# Patient Record
Sex: MALE | Race: WHITE | NOT HISPANIC OR LATINO | URBAN - NONMETROPOLITAN AREA
[De-identification: names, ages, dates, MRNs, and addresses within clinical notes are randomized per-mention and may not be internally consistent; named-entity substitution may affect disease eponyms.]

---

## 2017-02-27 PROBLEM — H40.013: Noted: 2021-01-26

## 2017-02-27 PROBLEM — H52.4: Noted: 2017-02-27

## 2017-02-27 PROBLEM — H40.013: Noted: 2017-02-27

## 2017-02-27 PROBLEM — H25.13: Noted: 2017-02-27

## 2017-02-27 PROBLEM — H25.813: Noted: 2021-01-26

## 2019-06-21 NOTE — PATIENT DISCUSSION
Patient understands condition, prognosis and need for follow up care. states does not take vaccine refused when offered

## 2019-12-11 ENCOUNTER — IMPORTED ENCOUNTER (OUTPATIENT)
Dept: URBAN - NONMETROPOLITAN AREA CLINIC 1 | Facility: CLINIC | Age: 74
End: 2019-12-11

## 2019-12-11 PROCEDURE — 92014 COMPRE OPH EXAM EST PT 1/>: CPT

## 2019-12-11 PROCEDURE — 92015 DETERMINE REFRACTIVE STATE: CPT

## 2019-12-11 NOTE — PATIENT DISCUSSION
Presbyopia OU- Discussed diagnosis in detail with patient- New glasses Rx given today- Continue to monitorBorderline Glaucoma OU :secondary to steroid medications- Discussed diagnosis in detail with patient- Patient is a steroid responder - IOP today 22 OU- Cup to Disc noted at .35 OU- OCT done in the past stable by GPA- Continue to monitor Zavalla OU- Discussed diagnosis in detail  with patient - Discussed signs and symptoms of progression- Discussed UV protection- No treatment needed at this time - BAT donetodya 20/40 OU - Continue to monitor; 's Notes: MR 12/11/19DFE 12/11/19

## 2020-06-19 NOTE — PATIENT DISCUSSION
DISC PHOTOS STABLE OU.  DISCUSSED POSSIBLE CHANGES ON OCT FROM PREVIOUS VISIT.  REVIEWED 2 RECENT HVF'S WHICH APPEAR STABLE.  WILL HAVE PT CONTINUE CURRENT MEDICATIONS AT THIS TIME AND MONITOR AGAIN IN 4 MONTHS.  MAY NEED TO TARGET IOP LOW TEENS.

## 2020-10-16 NOTE — PATIENT DISCUSSION
VF worsening OU target lower IOP . Pt has had some compliance issues stressed importance of. Will have pt stop Azopt and start Dorzolamide/ Timolol . Will continue Lumigan (Latanoprost).

## 2021-01-26 ENCOUNTER — PREPPED CHART (OUTPATIENT)
Dept: URBAN - NONMETROPOLITAN AREA CLINIC 1 | Facility: CLINIC | Age: 76
End: 2021-01-26

## 2021-01-26 ENCOUNTER — IMPORTED ENCOUNTER (OUTPATIENT)
Dept: URBAN - NONMETROPOLITAN AREA CLINIC 1 | Facility: CLINIC | Age: 76
End: 2021-01-26

## 2021-01-26 PROCEDURE — 92014 COMPRE OPH EXAM EST PT 1/>: CPT

## 2021-01-26 PROCEDURE — 92015 DETERMINE REFRACTIVE STATE: CPT

## 2021-01-26 NOTE — PATIENT DISCUSSION
Discussed diagnosis in detail with patient. Recommend UV protection. BAT done previously 20/40 OU. No treatment needed at this time and will continue to monitor.

## 2021-01-26 NOTE — PATIENT DISCUSSION
Presbyopia OU- Discussed diagnosis in detail with patient- New glasses Rx given today- Continue to monitorBorderline Glaucoma OU :secondary to steroid medications- Discussed diagnosis in detail with patient- Patient is a steroid responder - IOP today 18 OU- Cup to Disc noted at .35 OU- OCT done in the past stable by GPA- Continue to monitor Cataracts OU- Discussed diagnosis in detail  with patient - Discussed signs and symptoms of progression- Discussed UV protection- No treatment needed at this time - BAT done previously 20/40 OU - Continue to monitor; 's Notes: MR 12/11/19DFE 12/11/19

## 2021-01-26 NOTE — PATIENT DISCUSSION
Discussed diagnosis in detail with patient. Patient is a steroid responder. IOP today 18 OU and cup to Disc noted at .35 OU. OCT done previously and stable by GPA.

## 2021-04-16 NOTE — PATIENT DISCUSSION
PLAN: ***PT elects BASIC PLUS and NAHUN Gu*** CE/IOL W/HYDRUS VS. I-STENT OD ONLY AT THIS TIME, goal -2.00. P.O w/O.D in EW. No van needed. Not a candidate for AV secondary to POAG. Pt wants to go home and think about lens options. Pt will let us know about drops as well. Will bill for efrain when he calls.

## 2021-04-16 NOTE — PATIENT DISCUSSION
PLAN: ***Pt elects BASIC PLUS and IMPRIMIS. -Riccardo** CE/IOL W/HYDRUS VS. I-STENT OD ONLY AT THIS TIME, goal -2.00. P.O w/O.D in EW. No van needed. Not a candidate for AV secondary to POAG. Pt wants to go home and think about lens options. Pt will let us know about drops as well. Will bill for efrain when he calls.

## 2022-01-21 ASSESSMENT — TONOMETRY
OS_IOP_MMHG: 18
OD_IOP_MMHG: 18

## 2022-01-21 ASSESSMENT — VISUAL ACUITY
OS_SC: 20/25-
OD_SC: 20/25-

## 2022-01-27 ENCOUNTER — ESTABLISHED PATIENT (OUTPATIENT)
Dept: URBAN - NONMETROPOLITAN AREA CLINIC 1 | Facility: CLINIC | Age: 77
End: 2022-01-27

## 2022-01-27 DIAGNOSIS — H52.4: ICD-10-CM

## 2022-01-27 PROCEDURE — 92015 DETERMINE REFRACTIVE STATE: CPT

## 2022-01-27 PROCEDURE — 92014 COMPRE OPH EXAM EST PT 1/>: CPT

## 2022-01-27 ASSESSMENT — VISUAL ACUITY
OS_CC: 20/20-1
OD_CC: J1
OS_CC: J1+
OS_SC: 20/40
OD_SC: 20/40
OD_CC: 20/20-1

## 2022-01-27 ASSESSMENT — TONOMETRY
OS_IOP_MMHG: 15
OD_IOP_MMHG: 14

## 2022-04-09 ASSESSMENT — PACHYMETRY
OS_CT_UM: 656; ADJ: THICK
OS_CT_UM: 656; ADJ: THICK
OD_CT_UM: 615; ADJ: THICK
OD_CT_UM: 615; ADJ: THICK

## 2022-04-09 ASSESSMENT — VISUAL ACUITY
OD_GLARE: 20/40
OD_CC: 20/40+
OD_CC: 20/25-
OD_PH: 20/20-2
OS_GLARE: 20/40
OD_SC: 20/100
OS_CC: 20/25-
OS_CC: 20/30-2
OS_SC: 20/100

## 2022-04-09 ASSESSMENT — TONOMETRY
OS_IOP_MMHG: 18
OD_IOP_MMHG: 22
OD_IOP_MMHG: 18
OS_IOP_MMHG: 22

## 2022-12-19 NOTE — PATIENT DISCUSSION
Discussed condition and exacerbating conditions/situations (e.g., dry/arid environments, overhead fans, air conditioners, side effect of medications).
Discussed lid hygiene, warm compress and eyelid wash.
No treatment currently indicated at this time.
OCT shows possible worsening OD.  OS stable.  Recommend lower IOP OD.  Discussed adding gtt vs SLT.  Pt currently without insurance.  Wishes to defer SLT at this time.  Will increase Timolol to BID OD. Continue qam OS and Lumigan and Azopt as directed.
Patient achieved a 15% reduction in IOP from pretreatment levels or a plan is in place to achieve this goal.
Patient understands condition, prognosis and need for follow up care.
Recommended artificial tears to use as directed.
Recommended artificial tears to use: 1 drop 4x a day in both eyes.
Stable.
The cataracts are mild and have minimal impact on vision at this time.
No

## 2023-06-20 ENCOUNTER — ESTABLISHED PATIENT (OUTPATIENT)
Dept: URBAN - NONMETROPOLITAN AREA CLINIC 1 | Facility: CLINIC | Age: 78
End: 2023-06-20

## 2023-06-20 DIAGNOSIS — H52.4: ICD-10-CM

## 2023-06-20 PROCEDURE — 92014 COMPRE OPH EXAM EST PT 1/>: CPT

## 2023-06-20 PROCEDURE — 92015 DETERMINE REFRACTIVE STATE: CPT

## 2023-06-20 ASSESSMENT — VISUAL ACUITY
OD_PH: 20/30-1
OS_PH: 20/30-2
OD_SC: 20/50+2
OS_SC: 20/40-1
OU_SC: 20/30-1

## 2023-06-20 ASSESSMENT — TONOMETRY
OS_IOP_MMHG: 14
OD_IOP_MMHG: 14

## 2023-08-02 NOTE — PATIENT DISCUSSION
----- Message from Alia Mulligan sent at 8/2/2023 11:00 AM CDT -----  Contact: self  Type:  RX Refill Request    Who Called:  pt  Refill or New Rx:  refill  RX Name and Strength:  metanx  How is the patient currently taking it? (ex. 1XDay):  as directed  Is this a 30 day or 90 day RX:  n/a  Preferred Pharmacy with phone number:    [facility] [place], [place] - [address] [facility]  [address] [facility] [address]  [facility] [address]  Phone: [phone] Fax: [phone]     Local or Mail Order:  local  Ordering Provider:  rk Diaz Call Back Number:  [phone]   Additional Information:  please advise      The cataracts are mild and have minimal impact on vision at this time.

## 2023-08-08 ENCOUNTER — DIAGNOSTICS ONLY (OUTPATIENT)
Dept: URBAN - NONMETROPOLITAN AREA CLINIC 1 | Facility: CLINIC | Age: 78
End: 2023-08-08

## 2023-08-08 DIAGNOSIS — H25.813: ICD-10-CM

## 2023-08-08 PROCEDURE — 92134 CPTRZ OPH DX IMG PST SGM RTA: CPT

## 2023-09-06 ENCOUNTER — CONSULTATION/EVALUATION (OUTPATIENT)
Dept: URBAN - NONMETROPOLITAN AREA CLINIC 1 | Facility: CLINIC | Age: 78
End: 2023-09-06

## 2023-09-06 DIAGNOSIS — H25.813: ICD-10-CM

## 2023-09-06 PROCEDURE — 92134 CPTRZ OPH DX IMG PST SGM RTA: CPT

## 2023-09-06 PROCEDURE — 99214 OFFICE O/P EST MOD 30 MIN: CPT

## 2023-09-06 ASSESSMENT — VISUAL ACUITY
OD_BAT: 20/150
OS_PH: 20/30-2
OS_AM: 20/30
OD_CC: 20/40
OD_PH: 20/30-2
OS_CC: 20/40
OD_PAM: 20/30
OS_BAT: 20/100
OD_SC: 20/50+2
OS_SC: 20/40-1

## 2023-09-06 ASSESSMENT — TONOMETRY
OD_IOP_MMHG: 14
OS_IOP_MMHG: 15

## 2023-11-13 ENCOUNTER — PRE-OP/H&P (OUTPATIENT)
Dept: URBAN - NONMETROPOLITAN AREA CLINIC 1 | Facility: CLINIC | Age: 78
End: 2023-11-13

## 2023-11-13 VITALS
SYSTOLIC BLOOD PRESSURE: 120 MMHG | HEIGHT: 69 IN | DIASTOLIC BLOOD PRESSURE: 78 MMHG | HEART RATE: 71 BPM | BODY MASS INDEX: 32.58 KG/M2 | WEIGHT: 220 LBS

## 2023-11-13 DIAGNOSIS — Z01.818: ICD-10-CM

## 2023-11-13 PROCEDURE — 99212 OFFICE O/P EST SF 10 MIN: CPT

## 2023-11-28 ENCOUNTER — SURGERY/PROCEDURE (OUTPATIENT)
Dept: URBAN - NONMETROPOLITAN AREA CLINIC 2 | Facility: CLINIC | Age: 78
End: 2023-11-28

## 2023-11-28 DIAGNOSIS — H25.811: ICD-10-CM

## 2023-11-28 PROCEDURE — 99199PCV PROF CUSTOM VISION PACKAGE

## 2023-11-28 PROCEDURE — 68841 INSJ RX ELUT IMPLT LAC CANAL: CPT

## 2023-11-28 PROCEDURE — 66984CV REMOVE CATARACT, INSERT LENS, CUSTOM VISION

## 2023-11-29 ENCOUNTER — POST OP/EVAL OF SECOND EYE (OUTPATIENT)
Dept: URBAN - NONMETROPOLITAN AREA CLINIC 1 | Facility: CLINIC | Age: 78
End: 2023-11-29

## 2023-11-29 DIAGNOSIS — H25.812: ICD-10-CM

## 2023-11-29 PROCEDURE — 99213 OFFICE O/P EST LOW 20 MIN: CPT

## 2023-11-29 RX ORDER — SODIUM CHLORIDE 50 MG/ML: 1 SOLUTION OPHTHALMIC

## 2023-11-29 ASSESSMENT — VISUAL ACUITY
OS_SC: 20/40-1
OD_PH: 20/40+2
OS_BAT: 20/100
OD_SC: 20/50-2
OS_PH: 20/30-2
OS_AM: 20/30
OS_CC: 20/40

## 2023-11-29 ASSESSMENT — TONOMETRY
OS_IOP_MMHG: 15
OD_IOP_MMHG: 36

## 2023-12-05 ENCOUNTER — SURGERY/PROCEDURE (OUTPATIENT)
Dept: URBAN - NONMETROPOLITAN AREA CLINIC 2 | Facility: CLINIC | Age: 78
End: 2023-12-05

## 2023-12-05 DIAGNOSIS — H25.812: ICD-10-CM

## 2023-12-05 PROCEDURE — 66984CV REMOVE CATARACT, INSERT LENS, CUSTOM VISION

## 2023-12-05 PROCEDURE — 99199PCV PROF CUSTOM VISION PACKAGE

## 2023-12-05 PROCEDURE — 68841 INSJ RX ELUT IMPLT LAC CANAL: CPT

## 2023-12-06 ENCOUNTER — POST-OP (OUTPATIENT)
Dept: URBAN - NONMETROPOLITAN AREA CLINIC 1 | Facility: CLINIC | Age: 78
End: 2023-12-06

## 2023-12-06 DIAGNOSIS — Z98.41: ICD-10-CM

## 2023-12-06 DIAGNOSIS — Z98.42: ICD-10-CM

## 2023-12-06 PROCEDURE — 99024 POSTOP FOLLOW-UP VISIT: CPT

## 2023-12-06 ASSESSMENT — TONOMETRY
OD_IOP_MMHG: 16
OS_IOP_MMHG: 28

## 2023-12-06 ASSESSMENT — VISUAL ACUITY
OS_SC: 20/70-1
OD_SC: 20/30-2

## 2023-12-13 ENCOUNTER — POST-OP (OUTPATIENT)
Dept: URBAN - NONMETROPOLITAN AREA CLINIC 1 | Facility: CLINIC | Age: 78
End: 2023-12-13

## 2023-12-13 DIAGNOSIS — Z98.42: ICD-10-CM

## 2023-12-13 DIAGNOSIS — Z98.41: ICD-10-CM

## 2023-12-13 PROCEDURE — 99024 POSTOP FOLLOW-UP VISIT: CPT

## 2023-12-13 ASSESSMENT — VISUAL ACUITY
OD_SC: 20/40-2
OS_PH: 20/25-1
OS_SC: 20/50
OU_SC: 20/30-1

## 2023-12-13 ASSESSMENT — TONOMETRY
OD_IOP_MMHG: 14
OS_IOP_MMHG: 14

## 2024-02-02 ENCOUNTER — POST-OP (OUTPATIENT)
Dept: URBAN - NONMETROPOLITAN AREA CLINIC 1 | Facility: CLINIC | Age: 79
End: 2024-02-02

## 2024-02-02 DIAGNOSIS — Z98.42: ICD-10-CM

## 2024-02-02 DIAGNOSIS — Z98.41: ICD-10-CM

## 2024-02-02 PROCEDURE — 99024 POSTOP FOLLOW-UP VISIT: CPT

## 2024-02-02 ASSESSMENT — VISUAL ACUITY
OD_SC: 20/25-1
OS_SC: 20/25-1

## 2024-02-02 ASSESSMENT — TONOMETRY
OS_IOP_MMHG: 15
OD_IOP_MMHG: 14

## 2024-08-22 NOTE — PATIENT DISCUSSION
Patient follows up with psychiatry as an outpatient.  Currently on Celexa and Valium which we will continue   The cataracts are mild and have minimal impact on vision at this time.